# Patient Record
Sex: MALE | Race: ASIAN | Employment: FULL TIME | URBAN - METROPOLITAN AREA
[De-identification: names, ages, dates, MRNs, and addresses within clinical notes are randomized per-mention and may not be internally consistent; named-entity substitution may affect disease eponyms.]

---

## 2019-10-09 ENCOUNTER — OFFICE VISIT (OUTPATIENT)
Dept: FAMILY MEDICINE CLINIC | Facility: CLINIC | Age: 49
End: 2019-10-09
Payer: COMMERCIAL

## 2019-10-09 VITALS
DIASTOLIC BLOOD PRESSURE: 80 MMHG | TEMPERATURE: 98 F | BODY MASS INDEX: 29.84 KG/M2 | HEIGHT: 63 IN | SYSTOLIC BLOOD PRESSURE: 114 MMHG | WEIGHT: 168.38 LBS | RESPIRATION RATE: 16 BRPM | HEART RATE: 72 BPM

## 2019-10-09 DIAGNOSIS — R21 RASH: Primary | ICD-10-CM

## 2019-10-09 DIAGNOSIS — Z00.00 LABORATORY EXAM ORDERED AS PART OF ROUTINE GENERAL MEDICAL EXAMINATION: ICD-10-CM

## 2019-10-09 DIAGNOSIS — Z23 NEED FOR VACCINATION: ICD-10-CM

## 2019-10-09 DIAGNOSIS — Z12.5 PROSTATE CANCER SCREENING: ICD-10-CM

## 2019-10-09 DIAGNOSIS — L24.9 IRRITANT CONTACT DERMATITIS, UNSPECIFIED TRIGGER: ICD-10-CM

## 2019-10-09 DIAGNOSIS — Z13.31 NEGATIVE DEPRESSION SCREENING: ICD-10-CM

## 2019-10-09 PROCEDURE — 99203 OFFICE O/P NEW LOW 30 MIN: CPT | Performed by: FAMILY MEDICINE

## 2019-10-09 PROCEDURE — 90686 IIV4 VACC NO PRSV 0.5 ML IM: CPT | Performed by: FAMILY MEDICINE

## 2019-10-09 PROCEDURE — 90471 IMMUNIZATION ADMIN: CPT | Performed by: FAMILY MEDICINE

## 2019-10-09 RX ORDER — PREDNISONE 20 MG/1
20 TABLET ORAL DAILY
Qty: 5 TABLET | Refills: 0 | Status: SHIPPED | OUTPATIENT
Start: 2019-10-09 | End: 2019-10-14

## 2019-10-09 NOTE — PATIENT INSTRUCTIONS
Prednisone daily in AM  Benadryl nightly as needed for itching  Triamcinolone cream twice a day on affected areas  Follow up for allergy testing

## 2019-10-09 NOTE — PROGRESS NOTES
HPI:   Maco Dougherty is a 52year old male that presents for rash on arms,  legs, abdominal area. States off and on since the summer and has not gone away. Very itchy, erythematous, no skin breakdown or fever, no exudate. No new products, meds or travel.   Braulio File exudate, scattered excoriation. no bruising, good turgor. HEART:  Regular rate and rhythm, no murmurs, rubs or gallops. LUNGS: Clear to auscultation bilterally, no rales/rhonchi/wheezing. EXTREMITIES:  No edema, no cyanosis, 2+ radial pulses b/l.    NEUR before.     Len Shea DO  Family Medicine   10/9/2019  11:28 AM

## 2019-12-18 ENCOUNTER — OFFICE VISIT (OUTPATIENT)
Dept: FAMILY MEDICINE CLINIC | Facility: CLINIC | Age: 49
End: 2019-12-18
Payer: COMMERCIAL

## 2019-12-18 VITALS
HEART RATE: 72 BPM | SYSTOLIC BLOOD PRESSURE: 128 MMHG | DIASTOLIC BLOOD PRESSURE: 80 MMHG | BODY MASS INDEX: 30.72 KG/M2 | WEIGHT: 173.38 LBS | TEMPERATURE: 99 F | RESPIRATION RATE: 16 BRPM | HEIGHT: 63 IN

## 2019-12-18 DIAGNOSIS — L50.9 HIVES: ICD-10-CM

## 2019-12-18 PROCEDURE — 99213 OFFICE O/P EST LOW 20 MIN: CPT | Performed by: FAMILY MEDICINE

## 2019-12-18 RX ORDER — PREDNISONE 20 MG/1
20 TABLET ORAL DAILY
Qty: 5 TABLET | Refills: 0 | Status: SHIPPED | OUTPATIENT
Start: 2019-12-18 | End: 2019-12-23

## 2019-12-18 NOTE — PATIENT INSTRUCTIONS
Understanding Hives (Urticaria)    Urticaria (hives) are red, itchy, and swollen areas on the skin. They are most often an allergic reaction from eating a food or taking a medicine. Sometimes the cause may be unknown.  A single hive can vary in size from When to call your healthcare provider  Call your healthcare provider if:  · Your hives feel uncomfortable  · You have never had hives before  · Your symptoms don't go away or come back  · Your symptoms get worse or new symptoms develop such as:   ? [employer]

## 2019-12-18 NOTE — PROGRESS NOTES
HPI:   Higinio Pompa is a 52year old male that presents for recurring rash. Had similar rash in Oct that resolved completely in 3 days with prednisone and topical cream.  Very itchy, erythematous, no skin breakdown or fever, no exudate.   No new products, m +erythematous pinpoint papules on torso and back, +blanchable erythematous skin around abdomen near belt buckle with some scattered pinpoint papules. No skin breakdown or exudate, scattered excoriation. no bruising, good turgor.   HEART:  Regular rate and

## 2020-02-20 PROBLEM — J30.2 SEASONAL ALLERGIES: Status: ACTIVE | Noted: 2020-02-20

## 2020-05-27 ENCOUNTER — TELEPHONE (OUTPATIENT)
Dept: FAMILY MEDICINE CLINIC | Facility: CLINIC | Age: 50
End: 2020-05-27

## 2020-05-27 NOTE — TELEPHONE ENCOUNTER
Pt had appt today at 4:30. Called patient at 4:30 and again at 4:45 and LVM both times and no response.       Jay Vidales, DO  Family Medicine

## 2020-05-27 NOTE — TELEPHONE ENCOUNTER
Future Appointments   Date Time Provider Oksana Griffiths   5/27/2020  4:30 PM Diann Frank,  [facility] [address]     Patient verbally consents to a virtual/telephone check-in service for the date and time noted above.  Patient understands and accept

## 2020-05-28 ENCOUNTER — TELEPHONE (OUTPATIENT)
Dept: FAMILY MEDICINE CLINIC | Facility: CLINIC | Age: 50
End: 2020-05-28

## 2020-05-28 NOTE — TELEPHONE ENCOUNTER
Patient verbally consents to a virtual/telephone check-in service for the date and time noted above. Patient understands and accepts financial responsibility for any deductible, co-insurance, and co-pays associated with this service.

## 2020-05-29 ENCOUNTER — VIRTUAL PHONE E/M (OUTPATIENT)
Dept: FAMILY MEDICINE CLINIC | Facility: CLINIC | Age: 50
End: 2020-05-29
Payer: COMMERCIAL

## 2020-05-29 ENCOUNTER — LAB ENCOUNTER (OUTPATIENT)
Dept: LAB | Facility: HOSPITAL | Age: 50
End: 2020-05-29
Attending: FAMILY MEDICINE
Payer: COMMERCIAL

## 2020-05-29 DIAGNOSIS — R05.9 COUGH: Primary | ICD-10-CM

## 2020-05-29 PROCEDURE — 86769 SARS-COV-2 COVID-19 ANTIBODY: CPT

## 2020-05-29 PROCEDURE — 36415 COLL VENOUS BLD VENIPUNCTURE: CPT

## 2020-05-29 PROCEDURE — 99214 OFFICE O/P EST MOD 30 MIN: CPT | Performed by: FAMILY MEDICINE

## 2020-05-29 RX ORDER — METHYLPREDNISOLONE 4 MG/1
TABLET ORAL
Qty: 1 KIT | Refills: 0 | Status: SHIPPED | OUTPATIENT
Start: 2020-05-29 | End: 2020-07-02 | Stop reason: ALTCHOICE

## 2020-05-29 RX ORDER — ALBUTEROL SULFATE 90 UG/1
2 AEROSOL, METERED RESPIRATORY (INHALATION) EVERY 4 HOURS PRN
Qty: 1 INHALER | Refills: 2 | Status: SHIPPED | OUTPATIENT
Start: 2020-05-29 | End: 2020-11-25

## 2020-05-29 NOTE — PROGRESS NOTES
Virtual/Telephone Check-In    Jennifer Saini verbally consents to a Virtual/Telephone Check-In service on 05/29/20. Patient understands and accepts financial responsibility for any deductible, co-insurance and/or co-pays associated with this service.     Du appropriate, able to walk normally, pt is able to move all extremities without weakness    ASSESSMENT AND PLAN:      1. Cough  - SARS COV 2 SEROLOGY (COVID 19) AB (IGG), IA  - methylPREDNISolone (MEDROL) 4 MG Oral Tablet Therapy Pack; As directed.   Francois Muñiz

## 2020-05-29 NOTE — PATIENT INSTRUCTIONS
Coronavirus Disease 2019 (COVID-19): Prevention  The best prevention is to have no contact with the SARS-CoV-2 virus. There is no vaccine yet. Canceling travel and other outings  Stay informed about COVID-19 in your area.  Follow local instructions about · Clean frequently-touched home surfaces often with disinfectant. This includes desk surfaces, printers, phones, kitchen counters, tables, fridge door handle, bathroom surfaces, and any soiled surface. Closely follow disinfectant label instructions.  Go to · Don't shake hands with anyone. · Don’t have in-person meetings. Meet over phone or video. · Wash your hands often. Use soap and clean, running water for at least 20 seconds.   · If you don't have access to soap and water, use an alcohol-based hand sanit · Call your healthcare provider and follow all instructions. Your activities and where you go likely will be restricted for up to 2 weeks. Check your community's instructions about activity restrictions. You may be directed to stay home, or \"self-isolate. If there is a lot of inflammation, air flow is restricted. The air passages may also go into spasm, especially if you have asthma. This causes wheezing and difficulty breathing even in people who do not have asthma. Bronchitis usually lasts 7 to 14 days. · If prescribed, finish all antibiotic medicine, even if you are feeling better after only a few days. Follow-up care  Follow up with your healthcare provider, or as advised. If you had an X-ray or ECG (electrocardiogram), a specialist will review it.  You

## 2020-07-02 ENCOUNTER — OFFICE VISIT (OUTPATIENT)
Dept: FAMILY MEDICINE CLINIC | Facility: CLINIC | Age: 50
End: 2020-07-02
Payer: COMMERCIAL

## 2020-07-02 VITALS
HEART RATE: 81 BPM | RESPIRATION RATE: 16 BRPM | WEIGHT: 176 LBS | HEIGHT: 63 IN | TEMPERATURE: 98 F | BODY MASS INDEX: 31.18 KG/M2 | DIASTOLIC BLOOD PRESSURE: 84 MMHG | SYSTOLIC BLOOD PRESSURE: 136 MMHG

## 2020-07-02 DIAGNOSIS — Z00.00 ANNUAL PHYSICAL EXAM: Primary | ICD-10-CM

## 2020-07-02 DIAGNOSIS — Z23 NEED FOR VACCINATION: ICD-10-CM

## 2020-07-02 DIAGNOSIS — J45.40 MODERATE PERSISTENT REACTIVE AIRWAY DISEASE WITHOUT COMPLICATION: ICD-10-CM

## 2020-07-02 DIAGNOSIS — Z12.11 COLON CANCER SCREENING: ICD-10-CM

## 2020-07-02 DIAGNOSIS — Z13.31 NEGATIVE DEPRESSION SCREENING: ICD-10-CM

## 2020-07-02 PROBLEM — J45.909 REACTIVE AIRWAY DISEASE: Status: ACTIVE | Noted: 2020-07-02

## 2020-07-02 PROBLEM — J45.909 REACTIVE AIRWAY DISEASE (HCC): Status: ACTIVE | Noted: 2020-07-02

## 2020-07-02 PROCEDURE — 90471 IMMUNIZATION ADMIN: CPT | Performed by: FAMILY MEDICINE

## 2020-07-02 PROCEDURE — 90750 HZV VACC RECOMBINANT IM: CPT | Performed by: FAMILY MEDICINE

## 2020-07-02 PROCEDURE — 99213 OFFICE O/P EST LOW 20 MIN: CPT | Performed by: FAMILY MEDICINE

## 2020-07-02 PROCEDURE — 99396 PREV VISIT EST AGE 40-64: CPT | Performed by: FAMILY MEDICINE

## 2020-07-02 NOTE — PATIENT INSTRUCTIONS
DAILY INHALER: Flovent inhaler 1 puff twice a day to help prevent symptoms. Use for 1-2 months.       AS NEEDED: Can add your albuterol rescue inhaler every 4-6 hours as needed for cough, shortness of breath, wheezing, tightness in chest    Complete blood tests are best for you   Depression All men in this age group At routine exams   Type 2 diabetes or prediabetes All men beginning at age 39 and men without symptoms at any age who are overweight or obese and have 1 or more other risk factors for diabetes A months after the second dose and at least 4 months after the first dose   Haemophilus influenzae Type B (HIB) Men at increased risk for infection – talk with your healthcare provider 1 to 3 doses   Influenza (flu) All men in this age group Once a year   Me

## 2020-07-02 NOTE — PROGRESS NOTES
Jp Rg is a 48year old male that presents for annual physical exam.  Labs pending.      Diet and exercise: No  STI testing desired: No  Colonoscopy: Colonoscopy due on 06/20/2020 -DUE   PSA: PSA due on 06/20/2020   PHQ2: 0, CSSR; Neg  Vaccines:   Im       Spouse name: Not on file      Number of children: Not on file      Years of education: Not on file      Highest education level: Not on file    Occupational History      Not on file    Social Needs      Financial resource strain: Not on file denies abdominal pain, denies heartburn  : denies dysuria, hematuria, erectile dysfunction, nocturia   MUSCULOSKELETAL: denies back pain  NEURO: denies headaches  PSYCHE: denies depression or anxiety  HEMATOLOGIC: denies hx of anemia  ENDOCRINE: denies t (two) times daily. Dispense: 1 Inhaler; Refill: 1  - will add flovent for 1-2 mo and continue albuterol prn, can taper down and stop if symptoms resolved      Risks, benefits, and alternatives of current treatment plan discussed in detail.   Questions and

## 2020-11-02 ENCOUNTER — OFFICE VISIT (OUTPATIENT)
Dept: FAMILY MEDICINE CLINIC | Facility: CLINIC | Age: 50
End: 2020-11-02
Payer: COMMERCIAL

## 2020-11-02 VITALS
WEIGHT: 175.13 LBS | DIASTOLIC BLOOD PRESSURE: 80 MMHG | BODY MASS INDEX: 31.03 KG/M2 | HEART RATE: 94 BPM | RESPIRATION RATE: 16 BRPM | TEMPERATURE: 98 F | SYSTOLIC BLOOD PRESSURE: 130 MMHG | HEIGHT: 63 IN

## 2020-11-02 DIAGNOSIS — Z28.21 INFLUENZA VACCINATION DECLINED BY PATIENT: ICD-10-CM

## 2020-11-02 DIAGNOSIS — J45.40 MODERATE PERSISTENT REACTIVE AIRWAY DISEASE WITHOUT COMPLICATION: ICD-10-CM

## 2020-11-02 DIAGNOSIS — M25.512 ACUTE PAIN OF LEFT SHOULDER: Primary | ICD-10-CM

## 2020-11-02 PROCEDURE — 3008F BODY MASS INDEX DOCD: CPT | Performed by: FAMILY MEDICINE

## 2020-11-02 PROCEDURE — 99214 OFFICE O/P EST MOD 30 MIN: CPT | Performed by: FAMILY MEDICINE

## 2020-11-02 PROCEDURE — 3079F DIAST BP 80-89 MM HG: CPT | Performed by: FAMILY MEDICINE

## 2020-11-02 PROCEDURE — 3075F SYST BP GE 130 - 139MM HG: CPT | Performed by: FAMILY MEDICINE

## 2020-11-02 RX ORDER — METHYLPREDNISOLONE 4 MG/1
TABLET ORAL
Qty: 1 KIT | Refills: 0 | Status: SHIPPED | OUTPATIENT
Start: 2020-11-02

## 2020-11-02 RX ORDER — CETIRIZINE HYDROCHLORIDE 10 MG/1
10 TABLET ORAL DAILY
COMMUNITY

## 2020-11-02 NOTE — PROGRESS NOTES
HPI:   James Gonsalez is a 48year old male that presents for left shoulder pain. Patient presents with:  Shoulder Pain: left shoulder, started yesterday with numbness and throbbing.  States after work he was unable to move it, he has limited ROM, he us BMI 31.02 kg/m²  Estimated body mass index is 31.02 kg/m² as calculated from the following:    Height as of this encounter: 63\". Weight as of this encounter: 175 lb 2 oz (79.4 kg). Vital signs reviewed. Appears stated age, well groomed.   Physical Exam reviewed. Patient (or parent) agrees to plan. Return if symptoms worsen or fail to improve.     Lianne Barboza DO  Family Medicine   11/2/2020  2:08 PM

## 2020-11-02 NOTE — PATIENT INSTRUCTIONS
Ice, Tylenol and rest.    Medrol dose pack in the morning with food (for inflammation)  Schedule MRI shoulder and follow up Ortho. Understanding Rotator Cuff Tendonitis    The rotator cuff is a group of 4 muscles and tendons in the shoulder.  Tendons · Medicines. Prescription or over-the-counter medicines can help ease pain and swelling. NSAIDs (nonsteroidal anti-inflammatory drugs) are the most common medicines used. They may be taken as pills. Or they may be put on the skin as a gel, cream, or patch.

## 2020-11-05 ENCOUNTER — HOSPITAL ENCOUNTER (OUTPATIENT)
Dept: MRI IMAGING | Age: 50
Discharge: HOME | End: 2020-11-05
Attending: FAMILY MEDICINE
Payer: COMMERCIAL

## 2020-11-05 DIAGNOSIS — M25.512 ACUTE PAIN OF LEFT SHOULDER: ICD-10-CM

## 2020-11-05 PROCEDURE — 73221 MRI JOINT UPR EXTREM W/O DYE: CPT | Performed by: FAMILY MEDICINE

## 2020-11-06 ENCOUNTER — TELEPHONE (OUTPATIENT)
Dept: FAMILY MEDICINE CLINIC | Facility: CLINIC | Age: 50
End: 2020-11-06

## 2020-11-06 NOTE — TELEPHONE ENCOUNTER
I would not recommend returning until he is cleared by ortho.         Boris Cabezas, DO  Family Medicine

## 2020-11-06 NOTE — TELEPHONE ENCOUNTER
See phone message below: Work note written on 11/2/2020 stated for pt to be excused from work pending shoulder imaging. Pt's spouse states he needs an updated note advising that he can return to work tomorrow. Note pended, please advise.

## 2020-11-09 ENCOUNTER — OFFICE VISIT (OUTPATIENT)
Dept: ORTHOPEDICS CLINIC | Facility: CLINIC | Age: 50
End: 2020-11-09
Payer: COMMERCIAL

## 2020-11-09 VITALS — OXYGEN SATURATION: 98 % | HEART RATE: 90 BPM

## 2020-11-09 DIAGNOSIS — M25.512 ACUTE PAIN OF LEFT SHOULDER: Primary | ICD-10-CM

## 2020-11-09 PROCEDURE — 99203 OFFICE O/P NEW LOW 30 MIN: CPT | Performed by: ORTHOPAEDIC SURGERY

## 2020-11-09 PROCEDURE — 99072 ADDL SUPL MATRL&STAF TM PHE: CPT | Performed by: ORTHOPAEDIC SURGERY

## 2020-11-09 RX ORDER — MELOXICAM 15 MG/1
15 TABLET ORAL DAILY
Qty: 10 TABLET | Refills: 1 | Status: SHIPPED | OUTPATIENT
Start: 2020-11-09

## 2020-11-10 NOTE — H&P
[facility] - ORTHOPEDICS  [facility] [id number]  [phone]     NEW PATIENT VISIT - HISTORY AND PHYSICAL EXAMINATION     Name: Miles Solano   MRN: [id number]  Date: 11/9/2020     CC: Left shoulder Constitutional: Negative for activity change, fatigue and fever. HENT: Negative for sore throat. Eyes: Negative for visual disturbance. Respiratory: Positive for cough and wheezing. Negative for shortness of breath.     Cardiovascular: Negative for c Mental Status: She is alert. Psychiatric:         Mood and Affect: Mood normal.     Examination of the left shoulder demonstrates:     Skin is intact, warm and dry.    Cervical:  Full ROM  Spurling's  Negative    Deformity:   none  Atrophy:   none    S PROCEDURE:  MRI SHOULDER, LEFT (CPT=73221)  COMPARISON:  None.   INDICATIONS:  M25.512 Pain in left shoulder  TECHNIQUE:  Multiplanar imaging of the shoulder including oblique coronal, axial and sagittal imaging was acquired including proton density fat sup IMPRESSION: Jp Rg is a 48year old right hand dominant male with left shoulder pain ongoing since Saturday, September 7 largely atraumatic onset. He has completed a Medrol Dosepak and using Tylenol for pain and has noted mild to moderate relief.

## 2020-11-12 ENCOUNTER — TELEPHONE (OUTPATIENT)
Dept: FAMILY MEDICINE CLINIC | Facility: CLINIC | Age: 50
End: 2020-11-12

## 2020-11-12 NOTE — TELEPHONE ENCOUNTER
Needs another note. They were waiting for the MRI results. He needs a note to have him returning back to work. Pt would like to  today at the office. No printer if sent through [employer].

## 2020-11-18 ENCOUNTER — PATIENT MESSAGE (OUTPATIENT)
Dept: FAMILY MEDICINE CLINIC | Facility: CLINIC | Age: 50
End: 2020-11-18

## 2020-11-18 NOTE — TELEPHONE ENCOUNTER
From: Niranjan Washburn  To: Regis Curiel DO  Sent: 11/18/2020 11:40 AM CST  Subject: Other     Sheba Poon sent Gustavo Wetzel claim that needs to be filled out by tomorrow. I have extra copy if needed.   Thank you

## 2020-11-23 DIAGNOSIS — J45.40 MODERATE PERSISTENT REACTIVE AIRWAY DISEASE WITHOUT COMPLICATION: ICD-10-CM

## 2020-11-23 DIAGNOSIS — R05.9 COUGH: ICD-10-CM

## 2020-11-23 NOTE — TELEPHONE ENCOUNTER
Forms received and have been faxed to [facility]. -[email or web address] message sent to patient informing of above.

## 2020-11-25 RX ORDER — FLUTICASONE PROPIONATE 220 UG/1
AEROSOL, METERED RESPIRATORY (INHALATION)
Qty: 12 G | Refills: 2 | Status: SHIPPED | OUTPATIENT
Start: 2020-11-25 | End: 2021-02-19

## 2020-11-25 RX ORDER — ALBUTEROL SULFATE 90 UG/1
AEROSOL, METERED RESPIRATORY (INHALATION)
Qty: 8.5 G | Refills: 5 | Status: SHIPPED | OUTPATIENT
Start: 2020-11-25 | End: 2021-05-14

## 2020-11-25 NOTE — TELEPHONE ENCOUNTER
Requested Prescriptions     Name from pharmacy: [place] [facility]         Will file in chart as: [facility]  MCG/ACT Inhalation Aerosol    Sig: INHALE 1 PUFF INTO THE LUNGS TWICE DAILY    Disp:  12 g    Refills:  0 (Pharmacy requested

## 2021-02-19 ENCOUNTER — TELEPHONE (OUTPATIENT)
Dept: FAMILY MEDICINE CLINIC | Facility: CLINIC | Age: 51
End: 2021-02-19

## 2021-02-19 DIAGNOSIS — J45.40 MODERATE PERSISTENT REACTIVE AIRWAY DISEASE WITHOUT COMPLICATION: ICD-10-CM

## 2021-02-19 RX ORDER — FLUTICASONE PROPIONATE 220 UG/1
1 AEROSOL, METERED RESPIRATORY (INHALATION) 2 TIMES DAILY
Qty: 36 G | Refills: 0 | Status: SHIPPED | OUTPATIENT
Start: 2021-02-19 | End: 2021-05-13

## 2021-02-19 NOTE — TELEPHONE ENCOUNTER
Patient's spouse states they went for a refill of Flovent and it was $400, she states if it's #90 it will not be that much, please advise.

## 2021-02-19 NOTE — TELEPHONE ENCOUNTER
Medication Quantity Refills Start End   FLOVENT  MCG/ACT Inhalation Aerosol (Discontinued) 12 g 2 11/25/2020 2/19/2021     Rx sent for 90 day supply  Wife of patient advised.  Verbalized understanding

## 2021-05-13 DIAGNOSIS — J45.40 MODERATE PERSISTENT REACTIVE AIRWAY DISEASE WITHOUT COMPLICATION: ICD-10-CM

## 2021-05-13 RX ORDER — FLUTICASONE PROPIONATE 220 UG/1
1 AEROSOL, METERED RESPIRATORY (INHALATION) 2 TIMES DAILY
Qty: 36 G | Refills: 0 | Status: SHIPPED | OUTPATIENT
Start: 2021-05-13

## 2021-05-13 NOTE — TELEPHONE ENCOUNTER
Fluticasone Propionate HFA (FLOVENT HFA) 220 MCG/ACT Inhalation Aerosol 36 g 0 2/19/2021     Last OV 11/2/20  No future appointments.   Asthma & COPD Medication Protocol Ioznfa2005/13/2021 04:41 PM   Asthma Action Score greater than or equal to 20    Appointm

## 2021-05-14 ENCOUNTER — TELEPHONE (OUTPATIENT)
Dept: FAMILY MEDICINE CLINIC | Facility: CLINIC | Age: 51
End: 2021-05-14

## 2021-05-14 DIAGNOSIS — R05.9 COUGH: ICD-10-CM

## 2021-05-14 RX ORDER — ALBUTEROL SULFATE 90 UG/1
2 AEROSOL, METERED RESPIRATORY (INHALATION) EVERY 4 HOURS PRN
Qty: 8.5 G | Refills: 5 | Status: SHIPPED | OUTPATIENT
Start: 2021-05-14

## 2021-05-14 NOTE — TELEPHONE ENCOUNTER
ALBUTEROL SULFATE  (90 Base) MCG/ACT Inhalation Aero Soln    [facility] #[id number] [facility] Aleyda SHERMAN [facility] AT [address], [phone], [phone]  ________________________________________    The medication below was

## 2021-11-12 NOTE — LETTER
ASTHMA ACTION PLAN for Tam Cardoso     : 1970     Date: 2020  Provider:  Isacc Marsh DO  Phone for doctor or clinic: [facility], [address] [place] [address] , [address] [facility]  [phone] Patient notified of medication changes

## 2022-03-01 RX ORDER — FLUTICASONE PROPIONATE 220 UG/1
AEROSOL, METERED RESPIRATORY (INHALATION)
Qty: 36 G | Refills: 0 | OUTPATIENT
Start: 2022-03-01

## 2022-03-03 RX ORDER — FLUTICASONE PROPIONATE 220 UG/1
AEROSOL, METERED RESPIRATORY (INHALATION)
Qty: 36 G | Refills: 0 | OUTPATIENT
Start: 2022-03-03

## 2022-03-07 ENCOUNTER — TELEMEDICINE (OUTPATIENT)
Dept: FAMILY MEDICINE CLINIC | Facility: CLINIC | Age: 52
End: 2022-03-07

## 2022-03-07 DIAGNOSIS — R05.9 COUGH: ICD-10-CM

## 2022-03-07 DIAGNOSIS — J45.40 MODERATE PERSISTENT REACTIVE AIRWAY DISEASE WITHOUT COMPLICATION: ICD-10-CM

## 2022-03-07 PROCEDURE — 99214 OFFICE O/P EST MOD 30 MIN: CPT | Performed by: FAMILY MEDICINE

## 2022-03-07 RX ORDER — METHYLPREDNISOLONE 4 MG/1
TABLET ORAL
Qty: 1 EACH | Refills: 0 | Status: SHIPPED | OUTPATIENT
Start: 2022-03-07

## 2022-03-07 RX ORDER — FLUTICASONE PROPIONATE 220 UG/1
1 AEROSOL, METERED RESPIRATORY (INHALATION) 2 TIMES DAILY
Qty: 36 G | Refills: 0 | Status: SHIPPED | OUTPATIENT
Start: 2022-03-07

## 2022-03-07 RX ORDER — AZITHROMYCIN 250 MG/1
TABLET, FILM COATED ORAL
Qty: 6 TABLET | Refills: 0 | Status: SHIPPED | OUTPATIENT
Start: 2022-03-07 | End: 2022-03-12

## 2022-07-15 DIAGNOSIS — R05.9 COUGH: ICD-10-CM

## 2022-07-16 RX ORDER — ALBUTEROL SULFATE 90 UG/1
AEROSOL, METERED RESPIRATORY (INHALATION)
Qty: 8.5 G | Refills: 5 | OUTPATIENT
Start: 2022-07-16

## 2022-11-10 ENCOUNTER — TELEMEDICINE (OUTPATIENT)
Dept: FAMILY MEDICINE CLINIC | Facility: CLINIC | Age: 52
End: 2022-11-10
Payer: COMMERCIAL

## 2022-11-10 DIAGNOSIS — J45.40 MODERATE PERSISTENT REACTIVE AIRWAY DISEASE WITHOUT COMPLICATION: ICD-10-CM

## 2022-11-10 DIAGNOSIS — U07.1 COVID-19: Primary | ICD-10-CM

## 2022-11-10 DIAGNOSIS — R05.9 COUGH: ICD-10-CM

## 2022-11-10 PROCEDURE — 99214 OFFICE O/P EST MOD 30 MIN: CPT | Performed by: FAMILY MEDICINE

## 2022-11-10 RX ORDER — ALBUTEROL SULFATE 90 UG/1
2 AEROSOL, METERED RESPIRATORY (INHALATION) EVERY 4 HOURS PRN
Qty: 8.5 G | Refills: 5 | Status: SHIPPED | OUTPATIENT
Start: 2022-11-10

## 2022-11-10 RX ORDER — FLUTICASONE PROPIONATE 220 UG/1
1 AEROSOL, METERED RESPIRATORY (INHALATION) 2 TIMES DAILY
Qty: 36 G | Refills: 0 | Status: SHIPPED | OUTPATIENT
Start: 2022-11-10

## 2022-11-10 NOTE — PATIENT INSTRUCTIONS
Thank you for choosing Andreina Berg MD at [place] [address]  To Do: Hossein Mireles  1. Please take meds as directed. [facility] is located in [address]. Monday, Tuesday & Friday - 8 a.m. to 4 p.m. Wednesday, Thursday - 7 a.m. to 3 p.m. The lab is closed daily from 12 p.m.-12:30 p.m. Saturday lab hours by appointment. Call [phone] to schedule the appointment. Please signup for Nirvaha, which is electronic access to your record if you have not done so. All your results will post on there. [email or web address]   You can NOW use Nirvaha to book your appointments with us, or consider using open access scheduling which is through the edward website [email or web address] and type in Andreina Berg MD and follow the links for \"Schedule Online Now\"    To schedule Imaging or tests at [facility] Scheduling [phone], Go to [facility] ER Building (For example: CT scans, X rays, Ultrasound, MRI)  Cardiac Testing in ER building Building A second floor Cardiac Testing [phone] (For example: Holter Monitor, Cardiac Stress tests,Event Monitor, or 2D Echocardiograms)  [facility] call [phone] usually in [facility] A  Walk in Clinic in [place] at [facility]. [address] Mon-Fri at 8am-7:30 p.m., and Sat/Sun 9:00a. m.-4:30 p.m. Also at [facility]  Call [phone] for info     Please call our office about any questions regarding your treatment/medicines/tests as a result of today's visit. For your safety, read the entire package insert of all medicines prescribed to you and be aware of all of the risks of treatment even beyond those discussed today. All therapies have potential risk of harm or side effects or medication interactions.   It is your duty and for your safety to discuss with the pharmacist and our office with questions, and to notify us and stop treatment if problems arise, but know that our intention is that the benefits outweigh those potential risks and we strive to make you healthier and to improve your quality of life. Referrals, and Radiology Information:    If your insurance requires a referral to a specialist, please allow 5 business days to process your referral request.    If Rossi Lubin MD orders a CT or MRI, it may take up to 10 business days to receive approval from your insurance company. Once our office has called informing you that the insurance company approved your testing, please call Central Scheduling at [phone]  Please allow our office 5 business days to contact you regarding any testing results. Refill policies:   Allow 3 business days for refills; controlled substances may take longer and must be picked up from the office in person. Narcotic medications can only be filled in 30 day increments and must be refilled at an office visit only. If your prescription is due for a refill, you may be due for a follow-up appointment. We cannot refill your maintenance medications at a preventative wellness visit. To best provide you care, patients receiving maintenance medications need to be seen at least twice a year.

## 2022-12-19 NOTE — TELEPHONE ENCOUNTER
Duplicate RX. Libtayo Pregnancy And Lactation Text: This medication is contraindicated in pregnancy and when breast feeding.

## 2023-04-11 ENCOUNTER — IMMUNIZATION (OUTPATIENT)
Dept: LAB | Age: 53
End: 2023-04-11
Attending: EMERGENCY MEDICINE
Payer: COMMERCIAL

## 2023-04-11 DIAGNOSIS — Z23 NEED FOR VACCINATION: Primary | ICD-10-CM

## 2023-04-11 PROCEDURE — 0124A SARSCOV2 VAC BVL 30MCG/0.3ML: CPT

## 2023-06-02 DIAGNOSIS — J45.40 MODERATE PERSISTENT REACTIVE AIRWAY DISEASE WITHOUT COMPLICATION: ICD-10-CM

## 2023-06-02 NOTE — TELEPHONE ENCOUNTER
Pt wife calling on behalf of Pt.  Requesting a refill of Fluticasone Propionate HFA ([facility] HFA) 220 MCG/ACT Inhalation Aerosol     [facility] #[id number] - [facility], [address] [place] [address] AT [address] [place] [address] OF [place] [address] [place], [phone], [address] [address] Phone: [phone] Fax: [phone]     Future Appointments   Date Time Provider Oksana Griffiths   6/5/2023 11:30 AM Patricia Amato MD [facility] [address]

## 2023-06-03 RX ORDER — FLUTICASONE PROPIONATE 220 UG/1
1 AEROSOL, METERED RESPIRATORY (INHALATION) 2 TIMES DAILY
Qty: 36 G | Refills: 0 | Status: SHIPPED | OUTPATIENT
Start: 2023-06-03 | End: 2023-06-05

## 2023-06-05 ENCOUNTER — TELEMEDICINE (OUTPATIENT)
Dept: FAMILY MEDICINE CLINIC | Facility: CLINIC | Age: 53
End: 2023-06-05
Payer: COMMERCIAL

## 2023-06-05 DIAGNOSIS — J45.40 MODERATE PERSISTENT REACTIVE AIRWAY DISEASE WITHOUT COMPLICATION: Primary | ICD-10-CM

## 2023-06-05 RX ORDER — ALBUTEROL SULFATE 90 UG/1
2 AEROSOL, METERED RESPIRATORY (INHALATION) EVERY 4 HOURS PRN
Qty: 8.5 G | Refills: 5 | Status: SHIPPED | OUTPATIENT
Start: 2023-06-05

## 2023-06-05 RX ORDER — FLUTICASONE PROPIONATE 220 UG/1
1 AEROSOL, METERED RESPIRATORY (INHALATION) 2 TIMES DAILY
Qty: 36 G | Refills: 0 | Status: SHIPPED | OUTPATIENT
Start: 2023-06-05

## 2023-07-19 ENCOUNTER — TELEMEDICINE (OUTPATIENT)
Dept: FAMILY MEDICINE CLINIC | Facility: CLINIC | Age: 53
End: 2023-07-19
Payer: COMMERCIAL

## 2023-07-19 DIAGNOSIS — J01.00 ACUTE MAXILLARY SINUSITIS, RECURRENCE NOT SPECIFIED: Primary | ICD-10-CM

## 2023-07-19 PROCEDURE — 99213 OFFICE O/P EST LOW 20 MIN: CPT | Performed by: FAMILY MEDICINE

## 2023-07-19 RX ORDER — AMOXICILLIN 500 MG/1
500 CAPSULE ORAL 2 TIMES DAILY
Qty: 20 CAPSULE | Refills: 0 | Status: SHIPPED | OUTPATIENT
Start: 2023-07-19 | End: 2023-07-29

## 2023-07-19 NOTE — PROGRESS NOTES
Subjective:   Patient ID: Jourdan Perez is a 48year old male. HPI  Mr. Helio Ricks is a pleasant 51-year-old gentleman presenting for video visit today for sinus congestion and mild cough. He recently came back from the [facility] and had developed symptoms in [place] when they came back. No fever no chest pain no shortness of breath no nausea no vomiting no abdominal pain. Tried over-the-counter medication but with no improvement. No sick contacts. I had reviewed past medical and family histories together with allergy and medication lists documented. History/Other:   Review of Systems   Constitutional:  Negative for fever. Respiratory:  Negative for shortness of breath. Cardiovascular:  Negative for chest pain. Gastrointestinal:  Negative for abdominal pain, diarrhea, nausea and vomiting. Current Outpatient Medications   Medication Sig Dispense Refill    amoxicillin 500 MG Oral Cap Take 1 capsule (500 mg total) by mouth 2 (two) times daily for 10 days. 20 capsule 0    albuterol 108 (90 Base) MCG/ACT Inhalation Aero Soln Inhale 2 puffs into the lungs every 4 (four) hours as needed for Wheezing or Shortness of Breath. 8.5 g 5    Fluticasone Propionate HFA (FLOVENT HFA) 220 MCG/ACT Inhalation Aerosol Inhale 1 puff into the lungs 2 (two) times daily. Please give 90 day supply 36 g 0    methylPREDNISolone (MEDROL) 4 MG Oral Tablet Therapy Pack As directed. 1 each 0    Meloxicam 15 MG Oral Tab Take 1 tablet (15 mg total) by mouth daily. 10 tablet 1    cetirizine 10 MG Oral Tab Take 10 mg by mouth daily. methylPREDNISolone (MEDROL) 4 MG Oral Tablet Therapy Pack As directed. 1 kit 0    triamcinolone acetonide 0.1 % External Cream Apply topically 2 (two) times daily as needed. 60 g 3     Allergies:  Sulfa Antibiotics       HIVES  Dander                  RASH  Dust Mites              RASH    Objective:   Physical Exam  Constitutional:       General: He is not in acute distress.   Neurological: Mental Status: He is alert. Assessment & Plan:   Acute maxillary sinusitis, recurrence not specified  (primary encounter diagnosis)  -Continue with Sudafed or may take Mucinex as needed  - May take Tylenol or ibuprofen as needed for fever pain  - Keep hydrated  - We will treat with amoxicillin as directed  - Call or come in sooner if symptoms worsen or persist      This note was prepared using CNZZ voice recognition dictation software. As a result errors may occur. When identified these errors have been corrected. While every attempt is made to correct errors during dictation discrepancies may still exist          No orders of the defined types were placed in this encounter. Meds This Visit:  Requested Prescriptions     Signed Prescriptions Disp Refills    amoxicillin 500 MG Oral Cap 20 capsule 0     Sig: Take 1 capsule (500 mg total) by mouth 2 (two) times daily for 10 days.        Imaging & Referrals:  None

## 2023-12-14 ENCOUNTER — TELEPHONE (OUTPATIENT)
Dept: FAMILY MEDICINE CLINIC | Facility: CLINIC | Age: 53
End: 2023-12-14

## 2023-12-14 DIAGNOSIS — J45.40 MODERATE PERSISTENT REACTIVE AIRWAY DISEASE WITHOUT COMPLICATION: ICD-10-CM

## 2023-12-14 RX ORDER — FLUTICASONE PROPIONATE 220 UG/1
1 AEROSOL, METERED RESPIRATORY (INHALATION) 2 TIMES DAILY
Qty: 36 G | Refills: 0 | Status: SHIPPED | OUTPATIENT
Start: 2023-12-14

## 2023-12-14 NOTE — TELEPHONE ENCOUNTER
Celso Knox- Pt's wife is calling to request a 3 month refill. Pt will schedule Annual physical.    Fluticasone Propionate HFA (FLOVENT HFA) 220 MCG/ACT Inhalation Aerosol 36 g 0 6/5/2023 --    Sig - Route: Inhale 1 puff into the lungs 2 (two) times daily.  Please give 90 day supply - Inhalation    Sent to pharmacy as: Fluticasone Propionate  MCG/ACT Inhalation Aerosol ([address])        [facility] [address], [address] Rom[place]ne [place] AT [address], [phone], [phone]

## 2023-12-14 NOTE — TELEPHONE ENCOUNTER
Received incoming fax from pharmacy on Fluticasone Propionate HFA ([facility] HFA) 220 MCG, requesting PA.  Fax includes covered formulary alternatives, Key: [id number] Fax placed in MA folder

## 2023-12-14 NOTE — TELEPHONE ENCOUNTER
Medication Quantity Refills Start End   Fluticasone Propionate HFA (FLOVENT HFA) 220 MCG/ACT Inhalation Aerosol 36 g 0 6/5/2023 --   Sig:   Inhale 1 puff into the lungs 2 (two) times daily.  Please give 90 day supply     Route:   Inhalation     Order #:   [id number]       Future Appointments   Date Time Provider Oksana Aye   1/17/2024  7:30 AM Linh Elias MD [facility] [address]      RX sent  Asthma & COPD Medication Protocol Mifyrv0012/14/2023 10:31 AM    Asthma Action Score greater than or equal to 20    AAP/ACT given in last 12 months    Appointment in past 6 or next 3 months

## 2023-12-21 NOTE — TELEPHONE ENCOUNTER
N/A on December 14  We have dismissed the request for coverage we received from you or your prescriber because notification has been received from you or your prescriber that you will be switched to a formulary alternative and/or quantity allowed by your benefit, or you or your provider withdrew the request. If any alteration to an existing prescription or a new prescription is needed, please have the provider contact your pharmacy. The pharmacy may contact the Help Desk at [phone] if they need assistance in processing a claim. If you don't agree with this decision, within 6 months, you or your provider can call [phone] and ask us to vacate or reconsider the dismissal. Your provider can also submit a request online at [email or web address]

## 2024-05-23 DIAGNOSIS — J45.40 MODERATE PERSISTENT REACTIVE AIRWAY DISEASE WITHOUT COMPLICATION (HCC): ICD-10-CM

## 2024-06-04 ENCOUNTER — OFFICE VISIT (OUTPATIENT)
Dept: FAMILY MEDICINE CLINIC | Facility: CLINIC | Age: 54
End: 2024-06-04
Payer: COMMERCIAL

## 2024-06-04 VITALS
HEART RATE: 88 BPM | RESPIRATION RATE: 18 BRPM | OXYGEN SATURATION: 99 % | HEIGHT: 63 IN | DIASTOLIC BLOOD PRESSURE: 74 MMHG | BODY MASS INDEX: 33.1 KG/M2 | SYSTOLIC BLOOD PRESSURE: 128 MMHG | WEIGHT: 186.81 LBS | TEMPERATURE: 97 F

## 2024-06-04 DIAGNOSIS — J40 BRONCHITIS WITH ACUTE WHEEZING: ICD-10-CM

## 2024-06-04 DIAGNOSIS — R21 RASH AND NONSPECIFIC SKIN ERUPTION: ICD-10-CM

## 2024-06-04 DIAGNOSIS — J45.40 MODERATE PERSISTENT REACTIVE AIRWAY DISEASE WITHOUT COMPLICATION (HCC): Primary | ICD-10-CM

## 2024-06-04 PROCEDURE — 3008F BODY MASS INDEX DOCD: CPT | Performed by: FAMILY MEDICINE

## 2024-06-04 PROCEDURE — 3078F DIAST BP <80 MM HG: CPT | Performed by: FAMILY MEDICINE

## 2024-06-04 PROCEDURE — 3074F SYST BP LT 130 MM HG: CPT | Performed by: FAMILY MEDICINE

## 2024-06-04 PROCEDURE — 99214 OFFICE O/P EST MOD 30 MIN: CPT | Performed by: FAMILY MEDICINE

## 2024-06-04 RX ORDER — ALBUTEROL SULFATE 90 UG/1
2 AEROSOL, METERED RESPIRATORY (INHALATION) EVERY 4 HOURS PRN
Qty: 8.5 G | Refills: 1 | Status: SHIPPED | OUTPATIENT
Start: 2024-06-04 | End: 2024-06-06

## 2024-06-04 RX ORDER — METHYLPREDNISOLONE 4 MG/1
TABLET ORAL
Qty: 21 EACH | Refills: 0 | Status: SHIPPED | OUTPATIENT
Start: 2024-06-04

## 2024-06-04 RX ORDER — AZITHROMYCIN 250 MG/1
TABLET, FILM COATED ORAL
Qty: 6 TABLET | Refills: 0 | Status: SHIPPED | OUTPATIENT
Start: 2024-06-04 | End: 2024-06-08

## 2024-06-04 RX ORDER — FLUTICASONE PROPIONATE 220 UG/1
1 AEROSOL, METERED RESPIRATORY (INHALATION) 2 TIMES DAILY
Qty: 36 G | Refills: 0 | Status: SHIPPED | OUTPATIENT
Start: 2024-06-04 | End: 2024-06-06

## 2024-06-04 RX ORDER — TRIAMCINOLONE ACETONIDE 1 MG/G
CREAM TOPICAL 2 TIMES DAILY PRN
Qty: 60 G | Refills: 3 | Status: SHIPPED | OUTPATIENT
Start: 2024-06-04

## 2024-06-04 NOTE — PROGRESS NOTES
CHIEF COMPLAINT:     Chief Complaint   Patient presents with    Asthma     Patient here to check up on his asthma, patient states he is having to use his inhaler more often and will get short of breath often as well.    Rash     Patient would like a rash on his right calf looked at, he states he has had the rash for at least 2 months.         HPI:   Mohsen Mireles is a 53 year old male with a hx of asthma presents for evaluation of a cough.  Onset was 2 weeks days ago. The cough has been getting worse, patient states that he is waking up with a phlegmy cough and it continuing throughout the day. He states that he is using his inhaler more and still is having issues controlling the wheezing. Treatments tried OTC cough medication, tylenol.  Reports minor fever, chills, sputum production, wheezing. Denies lung disease, asthma, copd, weight loss,  smoking, heart disease. Sick contacts: coworkers and kids. Recent travel: Denies Exposure to TB: Denies. Associated symptoms include: congestion, cough is keeping pt up at night, OTC cold meds have not been helping    Patient has a red pruritic rash on the front of the right calf that is causing his to itch continuously and it has been there for at least 2 months. Patient has tried several otc methods without success..     Current Outpatient Medications   Medication Sig Dispense Refill    Fluticasone Propionate HFA (FLOVENT HFA) 220 MCG/ACT Inhalation Aerosol Inhale 1 puff into the lungs 2 (two) times daily. Please give 90 day supply 36 g 0    triamcinolone 0.1 % External Cream Apply topically 2 (two) times daily as needed. 60 g 3    azithromycin (ZITHROMAX Z-PAIGE) 250 MG Oral Tab Take 2 tablets (500 mg total) by mouth daily for 1 day, THEN 1 tablet (250 mg total) daily for 4 days. 6 tablet 0    methylPREDNISolone (MEDROL) 4 MG Oral Tablet Therapy Pack As directed. 21 each 0    albuterol 108 (90 Base) MCG/ACT Inhalation Aero Soln Inhale 2 puffs into the lungs every 4 (four)  hours as needed for Wheezing or Shortness of Breath. 8.5 g 5    Meloxicam 15 MG Oral Tab Take 1 tablet (15 mg total) by mouth daily. 10 tablet 1    cetirizine 10 MG Oral Tab Take 1 tablet (10 mg total) by mouth daily.      methylPREDNISolone (MEDROL) 4 MG Oral Tablet Therapy Pack As directed. (Patient not taking: Reported on 6/4/2024) 1 each 0    methylPREDNISolone (MEDROL) 4 MG Oral Tablet Therapy Pack As directed. (Patient not taking: Reported on 6/4/2024) 1 kit 0      No past medical history on file.   Social History:  Social History     Socioeconomic History    Marital status:    Tobacco Use    Smoking status: Every Day    Smokeless tobacco: Never    Tobacco comments:     10 cigs per day   Vaping Use    Vaping status: Never Used   Substance and Sexual Activity    Alcohol use: Yes     Comment: very light    Drug use: Not Currently     Comment: weed once a year    Sexual activity: Yes        REVIEW OF SYSTEMS:   GENERAL: see HPI  SKIN: No rashes, or other skin lesions.   EYES: Denies blurred vision or double vision.  HENT:  See HPI  CARDIOVASCULAR: Denies palpitations  LUNGS: Per HPI.   GI: Denies N/V/C/D or abdominal pain.      EXAM:   /74 (BP Location: Left arm, Patient Position: Sitting, Cuff Size: adult)   Pulse 88   Temp 97.3 °F (36.3 °C) (Temporal)   Resp 18   Ht 5' 3\" (1.6 m)   Wt 186 lb 12.8 oz (84.7 kg)   SpO2 99%   BMI 33.09 kg/m²   GENERAL: well developed, well nourished,in no apparent distress  SKIN: no rashes, no suspicious lesions  EYES: Conjunctiva clear.  No scleral icterus.  HENT: Atraumatic, normocephalic.  TM's clear bilaterally.  Nostrils patent, nasal mucosa pink and non-inflamed.  No erythema of the throat.  NECK: supple, non-tender.  LUNGS: Normal respiratory rate. Normal effort.  + cough. + wheezing. No rales or crackles. No decreased BS.   CARDIO: RRR without murmur  LYMPH: No cervical or supraclavicular lymphadenopathy.   EXTREMITIES:  No clubbing, cyanosis, or  edema.    ASSESSMENT AND PLAN:   Mohsen Mireles is a 53 year old male who presents with:     ASSESSMENT:  Encounter Diagnoses   Name Primary?    Moderate persistent reactive airway disease without complication (HCC) Yes    Bronchitis with acute wheezing     Rash and nonspecific skin eruption        PLAN:  Meds as below.  Comfort Care as listed in Patient Instructions.      Meds & Refills for this Visit:  Requested Prescriptions     Signed Prescriptions Disp Refills    Fluticasone Propionate HFA (FLOVENT HFA) 220 MCG/ACT Inhalation Aerosol 36 g 0     Sig: Inhale 1 puff into the lungs 2 (two) times daily. Please give 90 day supply    triamcinolone 0.1 % External Cream 60 g 3     Sig: Apply topically 2 (two) times daily as needed.    azithromycin (ZITHROMAX Z-PAIGE) 250 MG Oral Tab 6 tablet 0     Sig: Take 2 tablets (500 mg total) by mouth daily for 1 day, THEN 1 tablet (250 mg total) daily for 4 days.    methylPREDNISolone (MEDROL) 4 MG Oral Tablet Therapy Pack 21 each 0     Sig: As directed.     Side effects, risks, benefits, of medication explained and discussed.      Recommended increased fluids/humidity  12-hour decongestant nasal spray twice daily for a maximum of 4 days  Steam inhalation for sinus pressure   OTC cold and flu medication as needed.   Robitussin or Robitussin DM as needed for cough  1 tsp honey for the cough prn    Tylenol as needed/Ibuprofen as needed, do not exceed 4000 mg of acetaminophen or 2400 mg of ibuprofen    Recheck if not improved in one week or sooner if worsening.    The patient indicates understanding of these issues and agrees to the plan.  The patient is asked to see PCP if sx's persist for more than 2 weeks, sooner if worsen.    There are no Patient Instructions on file for this visit.

## 2024-06-05 ENCOUNTER — TELEPHONE (OUTPATIENT)
Dept: FAMILY MEDICINE CLINIC | Facility: CLINIC | Age: 54
End: 2024-06-05

## 2024-06-05 NOTE — TELEPHONE ENCOUNTER
Prior authorization sent via sure scripts for Fluticasone Propionate HFA (FLOVENT HFA) 220 MCG/ACT Inhalation Aerosol. Awaiting response.    Waiting for Payer Response   6/5/2024  6:58 AM  Deadline to reply: June 7, 2024  3:17 PM Sending user: Kristi Willingham   Payer: [employer] - [employer] Case ID: [id number]    [phone]  Prior Authorization Request Evaluation Questions   This request expires on 06/07/2024 03:16 PM CST. Please provide all information requested. Failure to complete this form in its entirety may result in delayed processing or an adverse determination for insufficient information.  Question Answer   I certify that requests for tiering exceptions or cost reduction within the current plan year and pre-benefit requests in advance of the next plan year will not be considered using this method of submission. While [facility] department strives to review and respond to your request in a timely manner, any indication, expressed or implied, for tiering exception or cost reduction within the current plan year and pre-benefit requests in advance of the next plan year supplied through this submission method shall not be considered as valid. If you have a request for tiering exception or cost reduction within the current plan year or a pre-benefit request in advance of the next plan year and feel the member's life or health or ability to regain maximum function is in serious jeopardy, contact us at [phone] instead of using this method of submission. I acknowledge   Does the prescriber attest that applying the standard timeframe for review of this coverage determination will not seriously jeopardize the enrollee's life, health, or ability to regain maximum function? Expedited timeframe is needed   Is this medication being prescribed as continuation of therapy? Yes   What is the diagnosis for the medication being requested? Asthma   Has the patient had a trial and failure (of a minimum  30-day supply), contraindication, or intolerance to BOTH of the following preferred brands: (1) Arnuity Ellipta, (2) QVAR Redihaler? No   Has the provider submitted medical records (for example; chart notes) confirming patient requires a metered dose inhaler used with a spacer device due to ONE of the following: (1) Physical dexterity, (2) Inspiratory flow, (3) Cognitive status? Failure to submit medical records may lead to an adverse determination. Provider does not have medical records   What medication(s) has the patient tried and failed: None   What is the requested quantity? Less than or equal to 1 inhaler per 15 days   Waiting for Payer Response   6/4/2024  3:16 PM  Sending user: Derian Barnes APRN   Payer: [facility] - [employer]

## 2024-06-06 DIAGNOSIS — J45.40 MODERATE PERSISTENT REACTIVE AIRWAY DISEASE WITHOUT COMPLICATION (HCC): ICD-10-CM

## 2024-06-06 RX ORDER — FLUTICASONE PROPIONATE 220 UG/1
1 AEROSOL, METERED RESPIRATORY (INHALATION) 2 TIMES DAILY
Qty: 36 G | Refills: 0 | Status: SHIPPED | OUTPATIENT
Start: 2024-06-06

## 2024-06-06 RX ORDER — ALBUTEROL SULFATE 90 UG/1
2 AEROSOL, METERED RESPIRATORY (INHALATION) EVERY 4 HOURS PRN
Qty: 8.5 G | Refills: 1 | Status: SHIPPED | OUTPATIENT
Start: 2024-06-06

## 2024-06-07 NOTE — TELEPHONE ENCOUNTER
Pts wife calling. Insurance will need a 90 day supply of both medications sent to the [facility] pharmacy.

## 2024-06-20 ENCOUNTER — TELEPHONE (OUTPATIENT)
Dept: FAMILY MEDICINE CLINIC | Facility: CLINIC | Age: 54
End: 2024-06-20

## 2024-06-20 NOTE — TELEPHONE ENCOUNTER
Received Prior Authorization for    Disp Refills Start End    Fluticasone Propionate  MCG/ACT Inhalation Aerosol 12 g 2 6/17/2024 --    Sig - Route: Inhale 1 puff into the lungs 2 (two) times daily. - Inhalation    Prior authorization: Denied        Key:  [id number]    Placed fax in MA folder.

## 2024-06-21 ENCOUNTER — TELEPHONE (OUTPATIENT)
Dept: FAMILY MEDICINE CLINIC | Facility: CLINIC | Age: 54
End: 2024-06-21

## 2024-06-21 DIAGNOSIS — J45.40 MODERATE PERSISTENT REACTIVE AIRWAY DISEASE WITHOUT COMPLICATION (HCC): Primary | ICD-10-CM

## 2024-06-21 RX ORDER — BUDESONIDE AND FORMOTEROL FUMARATE DIHYDRATE 80; 4.5 UG/1; UG/1
2 AEROSOL RESPIRATORY (INHALATION) 2 TIMES DAILY
Qty: 1 EACH | Refills: 0 | Status: SHIPPED | OUTPATIENT
Start: 2024-06-21 | End: 2024-07-21

## 2024-06-21 NOTE — TELEPHONE ENCOUNTER
View all authorizations for this medication  Waiting for Payer Response   6/21/2024  8:03 AM  Deadline to reply: June 24, 2024  7:34 AM Sending user: Kristi Willingham   Payer: [facility] - [employer] Case ID: [id number]    [phone]  Attachment:  Document: ePA Attachment Budesonide-Formoterol Fumarate (SYMBICORT) 80-4.5 MCG/ACT Inhalation Aerosol 6/21/2024- 7:52 AM  Prior Authorization Request Evaluation Questions   This request expires on 06/24/2024 07:33 AM CST. Please provide all information requested. Failure to complete this form in its entirety may result in delayed processing or an adverse determination for insufficient information.  Question Answer   I certify that requests for tiering exceptions or cost reduction within the current plan year and pre-benefit requests in advance of the next plan year will not be considered using this method of submission. While [facility] strives to review and respond to your request in a timely manner, any indication, expressed or implied, for tiering exception or cost reduction within the current plan year and pre-benefit requests in advance of the next plan year supplied through this submission method shall not be considered as valid. If you have a request for tiering exception or cost reduction within the current plan year or a pre-benefit request in advance of the next plan year and feel the member's life or health or ability to regain maximum function is in serious jeopardy, contact us at [phone] instead of using this method of submission. I acknowledge   Does the prescriber attest that applying the standard timeframe for review of this coverage determination will not seriously jeopardize the enrollee's life, health, or ability to regain maximum function? Expedited timeframe is needed   Is this medication being prescribed as continuation of therapy? No   What is the patient's diagnosis? Moderate persistent reactive airway diease  without complication. Bronchitis with acute wheezing.   Please list all medications that the patient has tried and failed or has a contraindication or intolerance to for this indication: Albuterol 108 MCG,Fluticasone Propionate  MCG.   To further support this request, please submit relevant clinical documentation (for example, medical records, chart documentation, labs). Failure to submit medical records may lead to denial due to lack of required information. Medical records have been attached   What is the requested quantity? Less than or equal to 1 inhaler per 29 days   Waiting for Payer Response   6/21/2024  7:33 AM  Sending user: Derian Barnes APRN   Payer: [facility] - [employer]      Prior authorization submitted via [employer], awaiting response.

## 2024-06-21 NOTE — TELEPHONE ENCOUNTER
Denied   6/18/2024  1:40 PM  Appeal supported: No Appeal instructions: Appeals are not supported through ePA. Please refer to the fax case notice for appeals information and instructions.   Note from payer: Request Reference Number: [id number].  FLUTICAS HFA AER 220MCG is denied for not meeting the prior authorization requirement(s).  Details of this decision are in the notice attached below or have been faxed to you.   Payer: [facility] - [employer] Case ID: [id number]  Waiting for Payer Response   6/17/2024 11:13 AM  Deadline to reply: June 20, 2024 11:10 AM Sending user: Beth Mariano RN   Payer: [facility] - [employer] Case ID: [id number]    [phone]  Prior Authorization Request Evaluation Questions   This request expires on 06/20/2024 11:09 AM CST. Please provide all information requested. Failure to complete this form in its entirety may result in delayed processing or an adverse determination for insufficient information.          Prior authorization for Fluticasone Propionate was denied, no other alternatives offered. Routed to Derian Barnes for review.

## 2025-02-11 ENCOUNTER — TELEPHONE (OUTPATIENT)
Dept: FAMILY MEDICINE CLINIC | Facility: CLINIC | Age: 55
End: 2025-02-11

## 2025-02-11 ENCOUNTER — OFFICE VISIT (OUTPATIENT)
Dept: FAMILY MEDICINE CLINIC | Facility: CLINIC | Age: 55
End: 2025-02-11
Payer: COMMERCIAL

## 2025-02-11 VITALS
WEIGHT: 183 LBS | HEIGHT: 63 IN | SYSTOLIC BLOOD PRESSURE: 134 MMHG | BODY MASS INDEX: 32.43 KG/M2 | RESPIRATION RATE: 16 BRPM | TEMPERATURE: 98 F | DIASTOLIC BLOOD PRESSURE: 80 MMHG | HEART RATE: 84 BPM | OXYGEN SATURATION: 98 %

## 2025-02-11 DIAGNOSIS — Z00.00 WELLNESS EXAMINATION: Primary | ICD-10-CM

## 2025-02-11 DIAGNOSIS — Z12.5 SCREENING FOR MALIGNANT NEOPLASM OF PROSTATE: ICD-10-CM

## 2025-02-11 DIAGNOSIS — J45.40 MODERATE PERSISTENT REACTIVE AIRWAY DISEASE WITHOUT COMPLICATION (HCC): ICD-10-CM

## 2025-02-11 DIAGNOSIS — Z12.11 SCREEN FOR COLON CANCER: ICD-10-CM

## 2025-02-11 DIAGNOSIS — J06.9 UPPER RESPIRATORY TRACT INFECTION, UNSPECIFIED TYPE: ICD-10-CM

## 2025-02-11 PROBLEM — M25.512 ACUTE PAIN OF LEFT SHOULDER: Status: RESOLVED | Noted: 2020-11-05 | Resolved: 2025-02-11

## 2025-02-11 PROCEDURE — 99396 PREV VISIT EST AGE 40-64: CPT | Performed by: FAMILY MEDICINE

## 2025-02-11 PROCEDURE — 99213 OFFICE O/P EST LOW 20 MIN: CPT | Performed by: FAMILY MEDICINE

## 2025-02-11 PROCEDURE — 3079F DIAST BP 80-89 MM HG: CPT | Performed by: FAMILY MEDICINE

## 2025-02-11 PROCEDURE — 3008F BODY MASS INDEX DOCD: CPT | Performed by: FAMILY MEDICINE

## 2025-02-11 PROCEDURE — 3075F SYST BP GE 130 - 139MM HG: CPT | Performed by: FAMILY MEDICINE

## 2025-02-11 RX ORDER — FLUTICASONE PROPIONATE 220 UG/1
1 AEROSOL, METERED RESPIRATORY (INHALATION) 2 TIMES DAILY
Qty: 12 G | Refills: 2 | Status: SHIPPED | OUTPATIENT
Start: 2025-02-11

## 2025-02-11 RX ORDER — METHYLPREDNISOLONE 4 MG/1
TABLET ORAL
Qty: 1 EACH | Refills: 0 | Status: SHIPPED | OUTPATIENT
Start: 2025-02-11

## 2025-02-11 RX ORDER — AZITHROMYCIN 250 MG/1
TABLET, FILM COATED ORAL
Qty: 6 TABLET | Refills: 0 | Status: SHIPPED | OUTPATIENT
Start: 2025-02-11 | End: 2025-02-16

## 2025-02-11 NOTE — PROGRESS NOTES
Wellness Exam    REASON FOR VISIT:    Mohsen Mireles is a 54 year old male who presents for an Annual Health Assessment.    Current Complaints: Mr. Mireles is here for his wellness exam  Flu Shot: see immunization record  Health Maintenance Topics with due status: Overdue       Topic Date Due    Annual Physical Never done    Colorectal Cancer Screening Never done    Pneumococcal Vaccine: 50+ Years Never done    PSA Never done    Zoster Vaccines 08/27/2020    COVID-19 Vaccine 09/01/2024    Influenza Vaccine 10/01/2024    Annual Depression Screening 01/01/2025    Tobacco Cessation Counseling Never done     Reported Health:   He is a very pleasant 54-year-old gentleman with history of reactive airway disease/asthma presenting for his wellness exam today.  For the past few days he has been experiencing cough with congestion and wheezing.  Wife has had similar symptoms.  He denies fever, chest pain, shortness of breath, nausea, vomiting, abdominal pain.  He is a smoker.    I had reviewed past medical and family histories together with allergy and medication lists documented.    Details about the complaints:  N/A    General Health     How would you describe your current health state?: Good    Type of Diet: Balanced    How do you maintain positive mental well-being?: Social Interaction    How would you describe your daily physical activity?: Moderate    If you are a male age 45-79 or a female age 55-79, do you take aspirin?: No    Have you had any immunizations at another office such as Influenza, Hepatitis B, Tetanus, or Pneumococcal?: No    At any time do you feel concerned for the safety/well-being of yourself and/or your children, in your home or elsewhere?: No     CAGE:     Cut: Have you ever felt you should Cut down on your drinking?: No    Annoyed: Have people Annoyed you by criticizing your drinking?: No    Guilty: Have you ever felt bad or Guilty about your drinking?: No    Eye Opener: Have you ever had a drink  first thing in the morning to steady your nerves or to get rid of a hangover (Eye opener)?: No    Scoring  Total Score: 0     PHQ-4: Over the LAST 2 WEEKS       Depression Screening (PHQ-2/PHQ-9): Over the LAST 2 WEEKS   Little interest or pleasure in doing things (over the last two weeks)?: Not at all    Feeling down, depressed, or hopeless (over the last two weeks)?: Not at all    PHQ-2 SCORE: 0              PREVENTATIVE SERVICES  INDICATIONS AND SCHEDULE Recommendation Internal Lab or Procedure External Lab or Procedure   Colonoscopy Screen Every 10 years Health Maintenance   Topic Date Due    Colorectal Cancer Screening  Never done       Flex Sigmoidoscopy Screen  Every 5 years No results found for this or any previous visit.    Fecal Occult Blood  Annually No results found for: \"FOB\", \"OCCULTSTOOL\"    Obesity Screening Screen all adults annually Body mass index is 32.42 kg/m².      Preventive Services for Which Recommendations Vary with Risk Recommendation Internal Lab or Procedure External Lab or Procedure   Cholesterol Screening Recommended screening varies with age, risk and gender No results found for: \"LDL\", \"LDLC\"    Diabetes Screening  If history of high blood pressure or other  risk factors No results found for: \"A1C\"  No results found for: \"GLUCOSE\", \"GLU\"      Gonorrhea Screening If high risk No results found for: \"GONOCOCCUS\"    HIV Screening For all adults age 18-65, older adults at increased risk No results found for: \"HIV\"    Syphilis Screening Screen if pregnant or high risk No results found for: \"RPR\"    Hepatitis C Screening Screen those at high risk plus screen one time for adults born 1945-1 965 No results found for: \"HCVAB\"    Tuberculosis Screen If high risk No components found for: \"PPDINDURAT\"      ALLERGIES:   Allergies[1]    CURRENT MEDICATIONS:   Current Outpatient Medications   Medication Sig Dispense Refill    azithromycin (ZITHROMAX Z-PAIGE) 250 MG Oral Tab Take 2 tablets (500 mg  total) by mouth daily for 1 day, THEN 1 tablet (250 mg total) daily for 4 days. 6 tablet 0    methylPREDNISolone (MEDROL) 4 MG Oral Tablet Therapy Pack As directed. 1 each 0    Fluticasone Propionate  MCG/ACT Inhalation Aerosol Inhale 1 puff into the lungs 2 (two) times daily. 12 g 2    FLUTICASONE PROPIONATE  MCG/ACT Inhalation Aerosol INHALE 1 PUFF INTO THE LUNGS TWICE DAILY 36 g 0    ALBUTEROL 108 (90 Base) MCG/ACT Inhalation Aero Soln INHALE 2 PUFFS INTO THE LUNGS EVERY 4 HOURS AS NEEDED FOR WHEEZING OR SHORTNESS OF BREATH 8.5 g 1    triamcinolone 0.1 % External Cream Apply topically 2 (two) times daily as needed. 60 g 3    cetirizine 10 MG Oral Tab Take 1 tablet (10 mg total) by mouth daily.        MEDICAL INFORMATION:   No past medical history on file.   No past surgical history on file.   Family History   Problem Relation Age of Onset    Prostate Cancer Father     Colon Cancer Neg     Breast Cancer Neg       SOCIAL HISTORY:   Social History     Socioeconomic History    Marital status:    Tobacco Use    Smoking status: Every Day    Smokeless tobacco: Never    Tobacco comments:     10 cigs per day   Vaping Use    Vaping status: Never Used   Substance and Sexual Activity    Alcohol use: Yes     Comment: very light    Drug use: Not Currently     Comment: weed once a year    Sexual activity: Yes          REVIEW OF SYSTEMS:   Constitutional: Negative for fever, chills and fatigue.   HENT: Negative for hearing loss, congestion, sore throat and neck pain.    Eyes: Negative for pain and visual disturbance.   Respiratory: Negative for cough and shortness of breath.    Cardiovascular: Negative for chest pain and palpitations.   Gastrointestinal: Negative for nausea, vomiting, abdominal pain and diarrhea.   Genitourinary: Negative for urgency, frequency and difficulty urinating.   Musculoskeletal: Negative for arthralgias and no gait problem.   Skin: Negative for color change and rash.    Neurological: Negative for tremors, weakness and numbness.   Hematological: Negative for adenopathy. Does not bruise/bleed easily.   Psychiatric/Behavioral: Negative for confusion and agitation. The patient is not nervous/anxious.      EXAM:   /80   Pulse 84   Temp 98 °F (36.7 °C) (Temporal)   Resp 16   Ht 5' 3\" (1.6 m)   Wt 183 lb (83 kg)   SpO2 98%   BMI 32.42 kg/m²   Constitutional: He appears well-developed, nourished, and his stated age. Vital signs reviewed  Pleasant man   Head: Normocephalic and atraumatic.   Ear: TMs visible and normal bilaterally  Nose: Nose normal.   Eyes: EOM are normal. Pupils are equal, round, and reactive to light. No scleral icterus.   Neck: Normal range of motion. No thyromegaly present.   Cardiovascular: Normal rate, regular rhythm and normal heart sounds.  Exam reveals no friction rub.    No murmur heard.  Pulmonary/Chest: Tight air entry .effort normal and breath sounds normal. He has no wheezes. He has no rales.   Abdominal: Soft. Bowel sounds are normal. There is no tenderness.   Musculoskeletal: Normal range of motion. He exhibits no edema.   Lymphadenopathy:     He has no cervical adenopathy.   Neurological: He is alert and oriented to person, place, and time. He has normal reflexes.   Skin: Skin is warm. No rash noted. No erythema. with normal hair  Psychiatric: He has a normal mood and affect. His behavior is normal.     ASSESSMENT AND OTHER RELEVANT CHRONIC CONDITIONS:   Mohsen Mireles is a 54 year old male who presents for an Annual Health Assessment.   1. Wellness examination    2. Screening for malignant neoplasm of prostate    3. Screen for colon cancer    4. Upper respiratory tract infection, unspecified type    5. Moderate persistent reactive airway disease without complication (HCC)        PLAN SUMMARY:   Mohsen Mireles is a 54 year old male  Age appropriate cancer screening, labs, safety, immunizations were discussed with the patient and ordered as  follows:  Diagnoses and all orders for this visit:    Wellness examination  -     CBC With Differential With Platelet; Future  -     Lipid Panel; Future  -     Comp Metabolic Panel (14); Future  -     TSH and Free T4; Future    Screening for malignant neoplasm of prostate  -     PSA Total, Screen; Future    Screen for colon cancer  -     Gastro Referral - In Network    Upper respiratory tract infection, unspecified type  -     azithromycin (ZITHROMAX Z-PAIGE) 250 MG Oral Tab; Take 2 tablets (500 mg total) by mouth daily for 1 day, THEN 1 tablet (250 mg total) daily for 4 days.  -     methylPREDNISolone (MEDROL) 4 MG Oral Tablet Therapy Pack; As directed.    Moderate persistent reactive airway disease without complication (HCC)  -     Fluticasone Propionate  MCG/ACT Inhalation Aerosol; Inhale 1 puff into the lungs 2 (two) times daily.    We shall check routine labs and will notify him once we get test results.  Smoking cessation strongly advised.  Plan as above mentioned  Follow-up in 1 year as needed  Go to the emergency room if with respiratory distress.    This note was prepared using Dragon Medical voice recognition dictation software. As a result errors may occur. When identified these errors have been corrected. While every attempt is made to correct errors during dictation discrepancies may still exist            Orders Placed This Encounter   Procedures    CBC With Differential With Platelet    Lipid Panel    Comp Metabolic Panel (14)    TSH and Free T4    PSA Total, Screen       Imaging & Consults:  GASTRO - INTERNAL    His 5 year prevention plan includes: annual visits for fasting labs  Health Maintenance   Topic Date Due    Annual Physical  Never done    Colorectal Cancer Screening  Never done    Pneumococcal Vaccine: 50+ Years (1 of 2 - PCV) Never done    PSA  Never done    Zoster Vaccines (2 of 2) 08/27/2020    COVID-19 Vaccine (4 - 2024-25 season) 09/01/2024    Influenza Vaccine (1) 10/01/2024     Annual Depression Screening  01/01/2025    Tobacco Cessation Counseling  Never done    Asthma Control Test  06/04/2025    DTaP,Tdap,and Td Vaccines (2 - Td or Tdap) 10/09/2026    Meningococcal B Vaccine  Aged Out     Patient/Caregiver Education:  Patient/Caregiver Education: There are no barriers to learning. Medical education done.  Outcome: Patient verbalizes understanding.    Educated by: MD   The patient indicates understanding of these issues and agrees to the plan.    SUGGESTED VACCINATIONS - Influenza, Pneumococcal, Zoster, Tetanus     Immunization History   Administered Date(s) Administered    Covid-19 Vaccine Pfizer 30 mcg/0.3 ml 03/16/2021, 04/10/2021    Covid-19 Vaccine Pfizer Bivalent 30mcg/0.3mL 04/11/2023    FLULAVAL 6 months & older 0.5 ml Prefilled syringe (06563) 10/09/2019    TDAP 10/09/2016    Zoster Vaccine Recombinant Adjuvanted (Shingrix) 07/02/2020       Influenza Annually   Pneumococcal if high risk   Td/Tdap once then every 10 years   HPV Males 11-21   Zoster (Shingles) 60 and older: one dose   Varicella 2 doses if not immune   MMR 1-2 doses if born after 1956 and not immune     Patient Active Problem List   Diagnosis    Seasonal allergies    Reactive airway disease (HCC)     PREVENTIVE VISIT,EST,40-64          [1]   Allergies  Allergen Reactions    Sulfa Antibiotics HIVES    Dander RASH    Dust Mites RASH

## 2025-02-11 NOTE — PATIENT INSTRUCTIONS
Thank you for choosing Cesar Elias MD at [facility]  To Do: Mohsen VINOD Deangelonereida  1. Please see age appropriate health prevention below     Call [phone] to schedule the appointment.   Please signup for Combined Power, which is electronic access to your record if you have not done so.  All your results will post on there.  [email or web address]   You can NOW use Combined Power to book your appointments with us, or consider using open access scheduling which is through the [place] website [email or web address] and type in Cesar Elias MD and follow the links for \"Schedule Online Now\"    To schedule Imaging or tests at [place] call Central Scheduling [phone], Go to [facility] A ER Building (For example: CT scans, X rays, Ultrasound, MRI)  Cardiac Testing in ER building Building A second floor Cardiac Testing [phone] (For example: Holter Monitor, Cardiac Stress tests,Event Monitor, or 2D Echocardiograms)  [facility] call [phone] usually in [facility] A  Walk in Clinic in [place] at [address] Mon-Fri at 8am-7:30 p.m., and Sat/Sun 9:00a.m.-4:30 p.m.  Also at [address] [place] [address]  Call [phone] for info     Please call our office about any questions regarding your treatment/medicines/tests as a result of today's visit.  For your safety, read the entire package insert of all medicines prescribed to you and be aware of all of the risks of treatment even beyond those discussed today.  All therapies have potential risk of harm or side effects or medication interactions.  It is your duty and for your safety to discuss with the pharmacist and our office with questions, and to notify us and stop treatment if problems arise, but know that our intention is that the benefits outweigh those potential risks and we strive to make you healthier and to improve your quality of life.    Referrals, and Radiology Information:    If your insurance requires a referral to a specialist, please  allow 5 business days to process your referral request.    If Cesar Elias MD orders a CT or MRI, it may take up to 10 business days to receive approval from your insurance company. Once our office has called informing you that the insurance company approved your testing, please call Central Scheduling at [phone]  Please allow our office 5 business days to contact you regarding any testing results.    Refill policies:   Allow 3 business days for refills; controlled substances may take longer and must be picked up from the office in person.  Narcotic medications can only be filled in 30 day increments and must be refilled at an office visit only.  If your prescription is due for a refill, you may be due for a follow-up appointment.  We cannot refill your maintenance medications at a preventative wellness visit.  To best provide you care, patients receiving maintenance medications need to be seen at least twice a year.

## 2025-02-11 NOTE — TELEPHONE ENCOUNTER
Pts spouse requesting refill request for.     Fluticasone Propionate  MCG/ACT Inhalation Aerosol       [id number] - [place], [place] - [address] AT [facility] [address], [phone], [phone] [address]  [facility] [address]  Phone: [phone] Fax: [phone]

## 2025-02-13 ENCOUNTER — TELEPHONE (OUTPATIENT)
Dept: FAMILY MEDICINE CLINIC | Facility: CLINIC | Age: 55
End: 2025-02-13

## 2025-02-13 NOTE — TELEPHONE ENCOUNTER
Prior Authorization received for Fluticasone HFA 220MCG Oral InH      Please call Plan at [phone]   ID# is [id number].  Placed fax in MA folder.

## 2025-02-26 ENCOUNTER — TELEPHONE (OUTPATIENT)
Dept: FAMILY MEDICINE CLINIC | Facility: CLINIC | Age: 55
End: 2025-02-26

## 2025-02-26 DIAGNOSIS — J45.40 MODERATE PERSISTENT REACTIVE AIRWAY DISEASE WITHOUT COMPLICATION (HCC): ICD-10-CM

## 2025-02-26 RX ORDER — ALBUTEROL SULFATE 90 UG/1
2 INHALANT RESPIRATORY (INHALATION) EVERY 4 HOURS PRN
Qty: 8.5 G | Refills: 1 | Status: SHIPPED | OUTPATIENT
Start: 2025-02-26

## 2025-02-26 NOTE — TELEPHONE ENCOUNTER
Pts spouse calling stating that patient needs Fluticasone Propionate HFA (FLOVENT HFA) 220 MCG/ACT Inhalation Aerosol  and this rx needs a Prior Authorization.     Patient also needs ALBUTEROL 108 (90 Base) MCG/ACT Inhalation Aero Soln.     Please advise.       [id number] - [place], [place] - [address] AT [facility] [address], [phone], [phone] [address]  [facility] [address]  Phone: [phone] Fax: [phone]

## 2025-02-27 ENCOUNTER — TELEPHONE (OUTPATIENT)
Dept: FAMILY MEDICINE CLINIC | Facility: CLINIC | Age: 55
End: 2025-02-27

## 2025-03-05 NOTE — TELEPHONE ENCOUNTER
Approved   3/5/2025  8:59 AM  Appeal supported: No   Note from payer: Request Reference Number: [id number].  FLUTICAS HFA AER 220MCG is approved through 03/05/2026.  Your patient may now fill this prescription and it will be covered.   Payer: [employer] [facility] [employer] Case ID: [id number]

## 2025-05-25 DIAGNOSIS — J45.40 MODERATE PERSISTENT REACTIVE AIRWAY DISEASE WITHOUT COMPLICATION (HCC): ICD-10-CM

## 2025-05-27 RX ORDER — FLUTICASONE PROPIONATE 220 UG/1
1 AEROSOL, METERED RESPIRATORY (INHALATION) 2 TIMES DAILY
Qty: 12 G | Refills: 2 | Status: SHIPPED | OUTPATIENT
Start: 2025-05-27

## 2025-05-27 NOTE — TELEPHONE ENCOUNTER
Requesting Fluticasone inhaler  LOV: 2/11/25 Physical  RTC: 1 year  Last Relevant Labs: 6/4/24  Filled: 2/11/25 #12g with 2 refills    No future appointments.    Asthma & COPD Medication Protocol Cwjbrl5005/27/2025 10:43 AM   Protocol Details   ACT Score greater than or equal to 20    Appointment in past 6 or next 3 months    ACT recorded in the last 12 months    Medication is active on med list     Rx sent to pharmacy per protocol

## 2025-07-21 ENCOUNTER — OFFICE VISIT (OUTPATIENT)
Dept: FAMILY MEDICINE CLINIC | Facility: CLINIC | Age: 55
End: 2025-07-21
Payer: COMMERCIAL

## 2025-07-21 ENCOUNTER — HOSPITAL ENCOUNTER (OUTPATIENT)
Dept: GENERAL RADIOLOGY | Age: 55
Discharge: HOME | End: 2025-07-21
Attending: NURSE PRACTITIONER
Payer: COMMERCIAL

## 2025-07-21 VITALS
WEIGHT: 180 LBS | SYSTOLIC BLOOD PRESSURE: 138 MMHG | RESPIRATION RATE: 18 BRPM | BODY MASS INDEX: 30.73 KG/M2 | DIASTOLIC BLOOD PRESSURE: 90 MMHG | HEART RATE: 92 BPM | HEIGHT: 64 IN | TEMPERATURE: 98 F

## 2025-07-21 DIAGNOSIS — J98.11 ATELECTASIS OF LEFT LUNG: Primary | ICD-10-CM

## 2025-07-21 DIAGNOSIS — R07.81 RIB PAIN ON LEFT SIDE: ICD-10-CM

## 2025-07-21 PROCEDURE — 71046 X-RAY EXAM CHEST 2 VIEWS: CPT | Performed by: NURSE PRACTITIONER

## 2025-07-21 PROCEDURE — 3080F DIAST BP >= 90 MM HG: CPT | Performed by: NURSE PRACTITIONER

## 2025-07-21 PROCEDURE — 3075F SYST BP GE 130 - 139MM HG: CPT | Performed by: NURSE PRACTITIONER

## 2025-07-21 PROCEDURE — 99213 OFFICE O/P EST LOW 20 MIN: CPT | Performed by: NURSE PRACTITIONER

## 2025-07-21 PROCEDURE — 3008F BODY MASS INDEX DOCD: CPT | Performed by: NURSE PRACTITIONER

## 2025-07-21 RX ORDER — IBUPROFEN 600 MG/1
600 TABLET, FILM COATED ORAL EVERY 6 HOURS PRN
Qty: 20 TABLET | Refills: 0 | Status: SHIPPED | OUTPATIENT
Start: 2025-07-21

## 2025-07-21 NOTE — PATIENT INSTRUCTIONS
10 deep breaths every hour while awake  Follow up with PCP in the next couple of days  Go to ER for any worsening symptoms

## 2025-07-22 NOTE — PROGRESS NOTES
CHIEF COMPLAINT:     Chief Complaint   Patient presents with    Pain     Left side/ribs       HPI:   Moshen Mireles is a 55 year old male who presents with complaints of pain under left anterior ribs.  Reports he was coughing last night and \"felt something pop.\"  He has had some discomfort under his ribs since mostly with movement.  Denies dyspnea, but reports discomfort with a deep breath.  Patient is a smoker.  Denies other URI symptoms.      Current Medications[1]   Past Medical History[2]   Social History:  Short Social Hx on File[3]     REVIEW OF SYSTEMS:   GENERAL: Denies fever, chills,weight change, decreased appetite  SKIN: Denies rashes, skin wounds or ulcers.  EYES: Denies blurred vision or double vision  HENT: Denies congestion, rhinorrhea, sore throat or ear pain  CHEST: Denies chest pain, or palpitations  LUNGS: Denies shortness of breath, cough, or wheezing  GI: Denies abdominal pain, N/V/C/D.   MUSCULOSKELETAL: no arthralgia or swollen joints  LYMPH:  Denies lymphadenopathy  NEURO: Denies headaches or lightheadedness      EXAM:   /90   Pulse 92   Temp 98 °F (36.7 °C) (Other)   Resp 18   Ht 5' 4\" (1.626 m)   Wt 180 lb (81.6 kg)   BMI 30.90 kg/m²   GENERAL: well developed, well nourished,in no apparent distress  SKIN: no rashes,no suspicious lesions  HEAD: atraumatic, normocephalic  EYES: conjunctiva clear, EOM intact, PERRLA  EARS: TM's without erythema, no bulging, no retraction, no fluid, bony landmarks visible  NOSE: nostrils patent, no exudates, nasal mucosa pink and noninflamed  THROAT: oral mucosa pink, moist. No visible dental caries. Posterior pharynx is not erythematous. no exudates.  NECK: supple, non-tender  LUNGS: clear to auscultation bilaterally, no wheezes or rhonchi. Breathing is non labored.  CARDIO: RRR without murmur  GI: No visible scars, or masses. BS's present x4. No palpable masses or hepatosplenomegaly.  no tenderness on palpation in all quadrants  EXTREMITIES: no  cyanosis, clubbing or edema  LYMPH:  no lymphadenopathy.      Narrative   PROCEDURE: XR CHEST PA + LAT CHEST (CPT=71046)    INDICATIONS: DX-R07.81 Rib pain on left side;    PATIENT STATED HISTORY: Pt c/o stabbing chest pain the occurs every time he moves. Pt states he felt a pop and then pain, this occured in the moring of 7/21/2025.    COMPARISON:    FINDINGS:    LUNGS: Subsegmental atelectasis is noted within the left lower lobe. There is no focal airspace consolidation.    CARDIAC:Normal size cardiac silhouette.    MEDIASTINUM:Normal    PLEURA:Normal. No pleural effusions.    BONES:Normal for age    OTHER: Negative.    Impression   CONCLUSION:    Subsegmental atelectasis at the left lung base.              Electronically Verified and Signed by Attending Radiologist: José Donnelly MD 7/21/2025 4:36 PM  Workstation: [id number]       ASSESSMENT AND PLAN:     ASSESSMENT:  Encounter Diagnoses   Name Primary?    Rib pain on left side     Atelectasis of left lung Yes       No orders of the defined types were placed in this encounter.      Meds & Refills for this Visit:  Requested Prescriptions     Signed Prescriptions Disp Refills    ibuprofen 600 MG Oral Tab 20 tablet 0     Sig: Take 1 tablet (600 mg total) by mouth every 6 (six) hours as needed for Pain.       Imaging & Consults:  None    Follow Up with:  No follow-up provider specified.    PLAN:  Discussed XR with patient.  Advised close follow up with PCP.  Ibuprofen prn.  Go to ER for any worsening symptoms.  Patient agrees with plan.    Patient Instructions   10 deep breaths every hour while awake  Follow up with PCP in the next couple of days  Go to ER for any worsening symptoms    The patient indicates understanding of these issues and agrees to the plan.             [1]   Current Outpatient Medications   Medication Sig Dispense Refill    ibuprofen 600 MG Oral Tab Take 1 tablet (600 mg total) by mouth every 6 (six) hours as needed for Pain. 20 tablet 0     FLUTICASONE PROPIONATE  MCG/ACT Inhalation Aerosol INHALE 1 PUFF INTO THE LUNGS TWICE DAILY 12 g 2    albuterol 108 (90 Base) MCG/ACT Inhalation Aero Soln Inhale 2 puffs into the lungs every 4 (four) hours as needed for Wheezing or Shortness of Breath. 8.5 g 1    PEG 3350-KCl-NaBcb-NaCl-NaSulf (PEG-3350/ELECTROLYTES) 236 g Oral Recon Soln Take as directed by physician 4000 mL 0    methylPREDNISolone (MEDROL) 4 MG Oral Tablet Therapy Pack As directed. 1 each 0    FLUTICASONE PROPIONATE  MCG/ACT Inhalation Aerosol INHALE 1 PUFF INTO THE LUNGS TWICE DAILY 36 g 0    triamcinolone 0.1 % External Cream Apply topically 2 (two) times daily as needed. 60 g 3    cetirizine 10 MG Oral Tab Take 1 tablet (10 mg total) by mouth daily.     [2]   Past Medical History:   Decorative tattoo    Hearing loss    Wheezing   [3]   Social History  Socioeconomic History    Marital status:    Tobacco Use    Smoking status: Every Day    Smokeless tobacco: Never    Tobacco comments:     10 cigs per day   Vaping Use    Vaping status: Never Used   Substance and Sexual Activity    Alcohol use: Yes     Comment: very light    Drug use: Not Currently     Comment: weed once a year    Sexual activity: Yes

## 2025-07-23 ENCOUNTER — HOSPITAL ENCOUNTER (OUTPATIENT)
Dept: GENERAL RADIOLOGY | Age: 55
Discharge: HOME | End: 2025-07-23
Attending: FAMILY MEDICINE
Payer: COMMERCIAL

## 2025-07-23 ENCOUNTER — PATIENT MESSAGE (OUTPATIENT)
Dept: FAMILY MEDICINE CLINIC | Facility: CLINIC | Age: 55
End: 2025-07-23

## 2025-07-23 ENCOUNTER — OFFICE VISIT (OUTPATIENT)
Dept: FAMILY MEDICINE CLINIC | Facility: CLINIC | Age: 55
End: 2025-07-23
Payer: COMMERCIAL

## 2025-07-23 VITALS
BODY MASS INDEX: 31.58 KG/M2 | RESPIRATION RATE: 18 BRPM | HEIGHT: 64 IN | WEIGHT: 185 LBS | DIASTOLIC BLOOD PRESSURE: 78 MMHG | SYSTOLIC BLOOD PRESSURE: 132 MMHG | TEMPERATURE: 98 F | HEART RATE: 94 BPM | OXYGEN SATURATION: 98 %

## 2025-07-23 DIAGNOSIS — R07.89 CHEST WALL PAIN: ICD-10-CM

## 2025-07-23 DIAGNOSIS — J98.11 ATELECTASIS OF LEFT LUNG: ICD-10-CM

## 2025-07-23 DIAGNOSIS — J45.40 MODERATE PERSISTENT REACTIVE AIRWAY DISEASE WITHOUT COMPLICATION (HCC): ICD-10-CM

## 2025-07-23 DIAGNOSIS — R07.89 CHEST WALL PAIN: Primary | ICD-10-CM

## 2025-07-23 PROCEDURE — 99214 OFFICE O/P EST MOD 30 MIN: CPT | Performed by: FAMILY MEDICINE

## 2025-07-23 PROCEDURE — 3008F BODY MASS INDEX DOCD: CPT | Performed by: FAMILY MEDICINE

## 2025-07-23 PROCEDURE — 71046 X-RAY EXAM CHEST 2 VIEWS: CPT | Performed by: FAMILY MEDICINE

## 2025-07-23 PROCEDURE — 3078F DIAST BP <80 MM HG: CPT | Performed by: FAMILY MEDICINE

## 2025-07-23 PROCEDURE — 3075F SYST BP GE 130 - 139MM HG: CPT | Performed by: FAMILY MEDICINE

## 2025-07-23 PROCEDURE — G2211 COMPLEX E/M VISIT ADD ON: HCPCS | Performed by: FAMILY MEDICINE

## 2025-07-23 NOTE — PATIENT INSTRUCTIONS
Thank you for choosing Cesar Elias MD at [facility]  To Do: Mohsen Mireles  1. Please see below   Call [phone] to schedule the appointment.   Please signup for Blip, which is electronic access to your record if you have not done so.  All your results will post on there.  [email or web address]   You can NOW use FireIDharNetpulse to book your appointments with us, or consider using open access scheduling which is through the [place] website [email or web address] and type in Cesar Elias MD and follow the links for \"Schedule Online Now\"    To schedule Imaging or tests at [facility] call Central Scheduling [phone], Go to [facility] A ER Building (For example: CT scans, X rays, Ultrasound, MRI)  Cardiac Testing in ER building Building A second floor Cardiac Testing [phone] (For example: Holter Monitor, Cardiac Stress tests,Event Monitor, or 2D Echocardiograms)  [facility] call [phone] usually in [facility] A  Walk in Clinic in [place] at [address] Mon-Fri at 8am-7:30 p.m., and Sat/Sun 9:00a.m.-4:30 p.m.  Also at [address] [place] [address]  Call [phone] for info     Please call our office about any questions regarding your treatment/medicines/tests as a result of today's visit.  For your safety, read the entire package insert of all medicines prescribed to you and be aware of all of the risks of treatment even beyond those discussed today.  All therapies have potential risk of harm or side effects or medication interactions.  It is your duty and for your safety to discuss with the pharmacist and our office with questions, and to notify us and stop treatment if problems arise, but know that our intention is that the benefits outweigh those potential risks and we strive to make you healthier and to improve your quality of life.    Referrals, and Radiology Information:    If your insurance requires a referral to a specialist, please allow 5 business days to process your  referral request.    If Cesar Elias MD orders a CT or MRI, it may take up to 10 business days to receive approval from your insurance company. Once our office has called informing you that the insurance company approved your testing, please call Central Scheduling at [phone]  Please allow our office 5 business days to contact you regarding any testing results.    Refill policies:   Allow 3 business days for refills; controlled substances may take longer and must be picked up from the office in person.  Narcotic medications can only be filled in 30 day increments and must be refilled at an office visit only.  If your prescription is due for a refill, you may be due for a follow-up appointment.  We cannot refill your maintenance medications at a preventative wellness visit.  To best provide you care, patients receiving maintenance medications need to be seen at least twice a year.

## 2025-07-29 RX ORDER — FLUTICASONE PROPIONATE 220 UG/1
1 AEROSOL, METERED RESPIRATORY (INHALATION) 2 TIMES DAILY
Qty: 12 G | Refills: 2 | Status: SHIPPED | OUTPATIENT
Start: 2025-07-29

## 2025-07-29 RX ORDER — ALBUTEROL SULFATE 90 UG/1
2 INHALANT RESPIRATORY (INHALATION) EVERY 4 HOURS PRN
Qty: 8.5 G | Refills: 1 | Status: SHIPPED | OUTPATIENT
Start: 2025-07-29

## (undated) DIAGNOSIS — J45.40 MODERATE PERSISTENT REACTIVE AIRWAY DISEASE WITHOUT COMPLICATION: ICD-10-CM

## (undated) DIAGNOSIS — Z00.00 LABORATORY EXAMINATION ORDERED AS PART OF A ROUTINE GENERAL MEDICAL EXAMINATION: Primary | ICD-10-CM

## (undated) NOTE — LETTER
Date: 7/21/2025    Patient Name: Mohsen Mireles          To Whom it may concern:    This letter has been written at the patient's request. The above patient was seen at [facility] for treatment of a medical condition..    The patient may return to work/school on 07/24/25 with the following limitations none.        Sincerely,      Nyla Ann, NP

## (undated) NOTE — LETTER
Date: 11/6/2020    Patient Name: Nga Tomlinson          To Whom it may concern: This letter has been written at the patient's request. The above patient was seen at the [facility] for treatment of a medical condition.     This patient shoul

## (undated) NOTE — LETTER
ASTHMA ACTION PLAN for Mohsen Mireles     : 1970     Date: 2024  Provider:  JJ Puente  Phone for doctor or clinic: [facility], [address] [place] [address] [address]  [facility] [address] [phone]           You can use the colors of a traffic light to help learn about your asthma medicines.      1. Green - Go! % of Personal Best Peak Flow Use controller medicine.   Breathing is good  No cough or wheeze  Can work and play Medicine How much to take When to take it    Flovent inhaler 220 mcg, 2 puffs twice daily   Cetirizine 1 tab daily       2. Yellow - Caution. 50-79% Personal Best Peak  Flow.  Use reliever medicine to keep an asthma attack from getting bad.   Cough  Wheezing  Tight Chest  Wake up at night Medicine How much to take When to take it    Albuterol inhaler, 2 puffs every four hours as needed        Additional instructions         3. Red - Stop! Danger!  <50% Personal Best Peak  Flow. Take these medications until  Get help from a doctor   Medicine not helping  Breathing is hard and fast  Nose opens wide  Can't walk  Ribs show  Can't talk well Medicine How much to take When to take it    Albuterol Inhaler 2 puffs every 20 minutes up to 3 times in a row for severe symptoms on the way to the Emergency Room.       Additional Instructions If your symptoms do not improve and you cannot contact your doctor, [facility] or call 911 immediately!     [x] Asthma Action Plan reviewed with patient (and caregiver if necessary) and a copy of the plan was given to the patient/caregiver.   [] Asthma Action Plan reviewed with patient (and caregiver if necessary) on the phone and mailed copy to patient or submitted via ithinksport.     Signatures:  Provider  JJ Puente   Patient Caretaker

## (undated) NOTE — LETTER
Date: 11/2/2020    Patient Name: Nga Tomlinson          To Whom it may concern: This letter has been written at the patient's request. The above patient was seen at the [facility] for treatment of a medical condition.     This patient shoul

## (undated) NOTE — LETTER
Date: 7/24/2025    Patient Name: Mohsen Mireles          To Whom it may concern:    This letter has been written at the patient's request. The above patient was seen at [facility] for treatment of a medical condition.      The patient may return to work/school on 7/25/25 with the following limitations: none.        Sincerely,    Cesar Elias MD

## (undated) NOTE — LETTER
Date: 3/7/2022    Patient Name: Dejah George          To Whom it may concern: This letter has been written at the patient's request. The above patient was seen at the [facility] for treatment of a medical condition. This patient should be excused from attending work days that he had missed. The patient may return to work on 3/11/22 with the following limitations none.         Sincerely,    Roberto Martin MD

## (undated) NOTE — LETTER
Date: 11/12/2020    Patient Name: Nayana Lindsay          To Whom it may concern: This letter has been written at the patient's request. The above patient was seen at the [facility] for treatment of a medical condition.     The patient may r